# Patient Record
Sex: FEMALE | Race: WHITE | Employment: FULL TIME | URBAN - METROPOLITAN AREA
[De-identification: names, ages, dates, MRNs, and addresses within clinical notes are randomized per-mention and may not be internally consistent; named-entity substitution may affect disease eponyms.]

---

## 2018-02-16 ENCOUNTER — APPOINTMENT (OUTPATIENT)
Dept: GENERAL RADIOLOGY | Facility: CLINIC | Age: 43
End: 2018-02-16
Attending: PHYSICIAN ASSISTANT
Payer: OTHER MISCELLANEOUS

## 2018-02-16 ENCOUNTER — HOSPITAL ENCOUNTER (EMERGENCY)
Facility: CLINIC | Age: 43
Discharge: HOME | End: 2018-02-16
Attending: PHYSICIAN ASSISTANT | Admitting: PHYSICIAN ASSISTANT
Payer: OTHER MISCELLANEOUS

## 2018-02-16 VITALS
OXYGEN SATURATION: 100 % | TEMPERATURE: 97.6 F | HEART RATE: 66 BPM | BODY MASS INDEX: 29.45 KG/M2 | HEIGHT: 61 IN | DIASTOLIC BLOOD PRESSURE: 77 MMHG | SYSTOLIC BLOOD PRESSURE: 129 MMHG | RESPIRATION RATE: 16 BRPM | WEIGHT: 156 LBS

## 2018-02-16 DIAGNOSIS — S60.221A CONTUSION OF RIGHT HAND, INITIAL ENCOUNTER: ICD-10-CM

## 2018-02-16 PROCEDURE — 29125 APPL SHORT ARM SPLINT STATIC: CPT | Mod: RT

## 2018-02-16 PROCEDURE — 99284 EMERGENCY DEPT VISIT MOD MDM: CPT

## 2018-02-16 PROCEDURE — 73130 X-RAY EXAM OF HAND: CPT | Mod: RT

## 2018-02-16 PROCEDURE — 25000132 ZZH RX MED GY IP 250 OP 250 PS 637: Performed by: PHYSICIAN ASSISTANT

## 2018-02-16 PROCEDURE — 73110 X-RAY EXAM OF WRIST: CPT | Mod: RT

## 2018-02-16 RX ORDER — HYDROCODONE BITARTRATE AND ACETAMINOPHEN 5; 325 MG/1; MG/1
1-2 TABLET ORAL EVERY 4 HOURS PRN
Qty: 15 TABLET | Refills: 0 | Status: SHIPPED | OUTPATIENT
Start: 2018-02-16 | End: 2019-03-17

## 2018-02-16 RX ORDER — IBUPROFEN 600 MG/1
600 TABLET, FILM COATED ORAL ONCE
Status: COMPLETED | OUTPATIENT
Start: 2018-02-16 | End: 2018-02-16

## 2018-02-16 RX ADMIN — IBUPROFEN 600 MG: 600 TABLET ORAL at 14:35

## 2018-02-16 ASSESSMENT — ENCOUNTER SYMPTOMS
ARTHRALGIAS: 1
JOINT SWELLING: 1
NUMBNESS: 0

## 2018-02-16 NOTE — ED NOTES
Right hand crushed in a machine while working around noon today.  CMS intact  Patient alert and oriented x3.  Airway, breathing and circulation intact.  Last TYlenol at 1200

## 2018-02-16 NOTE — ED AVS SNAPSHOT
[facility]    [address]    [facility] [address]    Phone:  [phone]    Fax:  [phone]                                       Tran Rondon   MRN: [id number]    Department:  [facility] Emergency Department   Date of Visit:  2/16/2018           After Visit Summary Signature Page     I have received my discharge instructions, and my questions have been answered. I have discussed any challenges I see with this plan with the nurse or doctor.    ..........................................................................................................................................  Patient/Patient Representative Signature      ..........................................................................................................................................  Patient Representative Print Name and Relationship to Patient    ..................................................               ................................................  Date                                            Time    ..........................................................................................................................................  Reviewed by Signature/Title    ...................................................              ..............................................  Date                                                            Time

## 2018-02-16 NOTE — DISCHARGE INSTRUCTIONS
You can take Ibuprofen 600 mg three times daily and/or Tylenol 500-1000, alternating every 3-4 hours, for pain/fevers/body aches. No more than 4000 mg of Tylenol in 24 hours and no more than 3200 mg Ibuprofen in 24 hours.   Contusión De La Mano [Contusion: Hand]  Usted tiene toby CONTUSIÓN de la mano. [place] causa dolor localizado, hinchazón y a veces magulladura. No se ha roto ningún hueso. Esta lesión tardará de unos días a unas semanas en curarse.  Cuidado En Casa:  1) Mantenga el brazo elevado para reducir el dolor y la hinchazón. [place] es muy importante stef las primeras 48 horas.  2) Mantenga el yeso o la tablilla completamente seca a todo tiempo. Cuando se bañe ponga el brazo fuera del agua, protegido por toby bolsa plástica carley atada por la parte de arriba con toby ligadura elástica. Si se le moja el yeso o la tabilla de fibra de shahab, puede secarla con un secador de pelo.  3) Puede usar acetaminofén (Tylenol) o ibuprofeno (Motrin, Advil) para controlar el dolor, a menos que le receten otro medicamento para el dolor. [ NOTA : Si sufre de enfermedad del hígado o riñones, o alguna vez tuvo toby úlcera estomacal o sangrado gastrointestinal, hable con guy médico antes de usar estos medicamentos.] No use ibuprofeno con niños menores de seis meses de edad.  Seguimiento  con guy médico o en esta institución si no está empezando a mejorar en los próximos LAURENT días.  Nota : si le tomaron radiografías, un radiólogo las revisará y se le notificará de cualquier nuevo hallazgo que pueda afectar guy atención.  Busque Prontamente Atención Médica  si algo de lo siguiente ocurre:  -- Aumento del dolor o hinchazón  -- Enrojecimiento, calor o drenaje  -- Las stefano o los dedos se vuelven morados, fríos, adormecidos o con hormigueo  Date Last Reviewed: 4/29/2015 2000-2017 [employer]. [address] [place] [address]. Todos los derechos reservados. Esta información no pretende sustituir la atención  médica profesional. Sólo guy médico puede diagnosticar y tratar un problema de cristina.          Hand Contusion  You have a contusion. This is also called a bruise. There is swelling and some bleeding under the skin, but no broken bones. This injury generally takes a few days to a few weeks to heal.  During that time, the bruise will typically change in color from reddish, to purple-blue, to greenish-yellow, then to yellow-brown.  Home care    Elevate the hand to reduce pain and swelling. As much as possible, sit or lie down with the hand raised about the level of your heart. This is especially important during the first 48 hours.    Ice the hand to help reduce pain and swelling. Wrap a cold source (ice pack or ice cubes in a plastic bag) in a thin towel. Apply to the bruised area for 20 minutes every 1 to 2 hours the first day. Continue this 3 to 4 times a day until the pain and swelling goes away.    Unless another medicine was prescribed, you can take acetaminophen, ibuprofen, or naproxen to control pain. (If you have chronic liver or kidney disease or ever had a stomach ulcer or gastrointestinal bleeding, talk with your doctor before using these medicines.)  Follow up  Follow up with your healthcare provider or our staff as advised. Call if you are not improving within 1 to 2 weeks.  When to seek medical advice   Call your healthcare provider right away if you have any of the following:    Increased pain or swelling    Arm becomes cold, blue, numb or tingly    Signs of infection: Warmth, drainage, or increased redness or pain around the bruise    Inability to move the injured hand     Frequent bruising for unknown reasons  Date Last Reviewed: 2/1/2017 2000-2017 [employer]. [address] [place] [address]. All rights reserved. This information is not intended as a substitute for professional medical care. Always follow your healthcare professional's instructions.

## 2018-02-16 NOTE — LETTER
February 16, 2018      To Whom It May Concern:      Tran Rondon was seen in our Emergency Department today, 02/16/18.  Please excuse her from work due to hand injury on Monday, 2/19-2/21.    Sincerely,          Jessica Kowalski PA-C

## 2018-02-16 NOTE — ED PROVIDER NOTES
"  History     Chief Complaint:  Hand Injury    HPI   Tran Rondon is an otherwise healthy right handed 42 year old female who presents with a hand injury. The patient reports she was making lipsticks at work around noon and the machine she was using crashed down onto her right hand. She states she pulled her hand out as the machine was coming down and that it was not crushed/stuck between the machine and the table. She states she has pain in her right wrist, hand, and fingers and is concerned it might be broken, prompting her to come to the ED for evaluation. She notes she took Tylenol for pain and it has helped somewhat. She denies numbness in her hand or fingers or elbow pain.    Allergies:  No known drug allergies     Medications:    The patient is currently on no regular medications.     Past Medical History:    The patient does not have any past pertinent medical history.     Past Surgical History:    Appendectomy     Family History:    History reviewed. No pertinent family history.      Social History:  Smoking status: Never smoker  Alcohol use: No   Marital Status:  Unknown [6]     Review of Systems   Musculoskeletal: Positive for arthralgias (right wrist, hand, fingers) and joint swelling.   Neurological: Negative for numbness.   All other systems reviewed and are negative.      Physical Exam     Patient Vitals for the past 24 hrs:   BP Temp Temp src Pulse Resp SpO2 Height Weight   02/16/18 1418 129/77 97.6  F (36.4  C) Oral 66 16 100 % 1.549 m (5' 1\") 70.8 kg (156 lb)        Physical Exam  Constitutional:  Alert, attentive  Cardiovascular:  2+ radial and ulnar pulses to the bilateral upper extremities   Neurological:  5/5 strength to the radian, ulnar and median motor distributions;      sensation intact to light touch to the radian, ulnar and median distributions  MSK:   There is swelling with bruising and tenderness over the dorsal aspect of the right hand at the 2nd-4th metacarpals. The 2nd-4th " digits are slightly tender    to the touch as well but not obviously swollen or bruised. There is mild dorsal right wrist pain and limited ROM of wrist and digits. No pain to the right    forearm, elbow.   Skin:    Skin is warm and dry.      Emergency Department Course   Imaging:  Radiographic findings were communicated with the patient who voiced understanding of the findings.    Hand XR, G/E 3 Views, Right:  Negative.  As read by Radiology.     Wrist XR, G/E 3 Views, Right:  Negative.  As read by Radiology.     Interventions:  1435: Ibuprofen 600 mg PO     Emergency Department Course:  Past medical records, nursing notes, and vitals reviewed.  1426: I performed an exam of the patient and obtained history, as documented above.   The patient was sent for a right hand x-ray and right wrist x-ray while in the emergency department, findings above.     1500: I rechecked the patient. Explained findings to the patient.    Findings and plan explained to the Patient. Patient discharged home with instructions regarding supportive care, medications, and reasons to return. The importance of close follow-up was reviewed.   Impression & Plan    Medical Decision Making:  Tran Rondon is a 42 year old female presents for evaluation after injuring her right hand at work.  Signs and symptoms are consistent with a contusion.  A broad differential was considered including sprain, strain, fracture, tendon rupture, nerve impingement/compromise, referred pain. X-rays of the hand and wrist are negative. Supportive outpatient management is indicated. Velcro wrist splint provided for comfort. Pain medications to use as needed and precautions were given regarding narcotics.  Rest, ice, and elevation treatment was discussed with the patient. No other injuries. Close follow-up with patient's primary care physician per discharge precautions. Contusion discharge instructions given for home.     Diagnosis:    ICD-10-CM   1. Contusion of  right hand, initial encounter S60.221A     Disposition:  discharged to home    Discharge Medications:  New Prescriptions    HYDROCODONE-ACETAMINOPHEN (NORCO) 5-325 MG PER TABLET    Take 1-2 tablets by mouth every 4 hours as needed for moderate to severe pain       Codi Chowdary  2/16/2018   [facility] EMERGENCY DEPARTMENT    I, Codi Chowdary, am serving as a scribe at 2:26 PM on 2/16/2018 to document services personally performed by Kell Kowalski PA-C based on my observations and the provider's statements to me.        Kell Kowalski PA-C  02/16/18 1546

## 2018-02-16 NOTE — ED AVS SNAPSHOT
[facility] Emergency Department    [address]    [facility] [address]    Phone:  [phone]    Fax:  [phone]                                       Tran Rondon   MRN: [id number]    Department:  [facility] Emergency Department   Date of Visit:  2/16/2018           Patient Information     Date Of Birth          1975        Your diagnoses for this visit were:     Contusion of right hand, initial encounter        You were seen by Kell Kowalski PA-C.      Follow-up Information     Follow up with [facility].    Specialties:  Sports Medicine, Pain Management, Obstetrics & Gynecology, Pediatrics, Internal Medicine, Nephrology    Why:  As needed    Contact information:    [address] [place] [address]  [facility] [address] [phone]        Follow up with [facility] Emergency Department.    Specialty:  EMERGENCY MEDICINE    Why:  If symptoms worsen    Contact information:    [address] [facility] [id number] [phone]        Discharge Instructions         You can take Ibuprofen 600 mg three times daily and/or Tylenol 500-1000, alternating every 3-4 hours, for pain/fevers/body aches. No more than 4000 mg of Tylenol in 24 hours and no more than 3200 mg Ibuprofen in 24 hours.   Contusión De La Mano [Contusion: Hand]  Usted tiene toby CONTUSIÓN de la mano. [place] causa dolor localizado, hinchazón y a veces magulladura. No se ha roto ningún hueso. Esta lesión tardará de unos días a unas semanas en curarse.  Cuidado En Casa:  1) Mantenga el brazo elevado para reducir el dolor y la hinchazón. [place] es muy importante stef las primeras 48 horas.  2) Mantenga el yeso o la tablilla completamente seca a todo tiempo. Cuando se bañe ponga el brazo fuera del agua, protegido por toby bolsa plástica carley atada por la parte de arriba con toby ligadura elástica. Si se le moja el yeso o la tabilla  de fibra de shahab, puede secarla con un secador de pelo.  3) Puede usar acetaminofén (Tylenol) o ibuprofeno (Motrin, Advil) para controlar el dolor, a menos que le receten otro medicamento para el dolor. [ NOTA : Si sufre de enfermedad del hígado o riñones, o alguna vez tuvo toby úlcera estomacal o sangrado gastrointestinal, hable con guy médico antes de usar estos medicamentos.] No use ibuprofeno con niños menores de seis meses de edad.  Seguimiento  con guy médico o en esta institución si no está empezando a mejorar en los próximos LAURENT días.  Nota : si le tomaron radiografías, un radiólogo las revisará y se le notificará de cualquier nuevo hallazgo que pueda afectar guy atención.  Busque Prontamente Atención Médica  si algo de lo siguiente ocurre:  -- Aumento del dolor o hinchazón  -- Enrojecimiento, calor o drenaje  -- Las stefano o los dedos se vuelven morados, fríos, adormecidos o con hormigueo  Date Last Reviewed: 4/29/2015 2000-2017 [employer]. [address] [place] [address]. Todos los derechos reservados. Esta información no pretende sustituir la atención médica profesional. Sólo guy médico puede diagnosticar y tratar un problema de cristina.          Hand Contusion  You have a contusion. This is also called a bruise. There is swelling and some bleeding under the skin, but no broken bones. This injury generally takes a few days to a few weeks to heal.  During that time, the bruise will typically change in color from reddish, to purple-blue, to greenish-yellow, then to yellow-brown.  Home care    Elevate the hand to reduce pain and swelling. As much as possible, sit or lie down with the hand raised about the level of your heart. This is especially important during the first 48 hours.    Ice the hand to help reduce pain and swelling. Wrap a cold source (ice pack or ice cubes in a plastic bag) in a thin towel. Apply to the bruised area for 20 minutes every 1 to 2 hours the first day.  Continue this 3 to 4 times a day until the pain and swelling goes away.    Unless another medicine was prescribed, you can take acetaminophen, ibuprofen, or naproxen to control pain. (If you have chronic liver or kidney disease or ever had a stomach ulcer or gastrointestinal bleeding, talk with your doctor before using these medicines.)  Follow up  Follow up with your healthcare provider or our staff as advised. Call if you are not improving within 1 to 2 weeks.  When to seek medical advice   Call your healthcare provider right away if you have any of the following:    Increased pain or swelling    Arm becomes cold, blue, numb or tingly    Signs of infection: Warmth, drainage, or increased redness or pain around the bruise    Inability to move the injured hand     Frequent bruising for unknown reasons  Date Last Reviewed: 2/1/2017 2000-2017 [employer]. [address] [place] [address]. All rights reserved. This information is not intended as a substitute for professional medical care. Always follow your healthcare professional's instructions.          24 Hour Appointment Hotline       To make an appointment at any [facility], call [phone] ([phone]). If you don't have a family doctor or clinic, we will help you find one. [facility] are conveniently located to serve the needs of you and your family.             Review of your medicines      Notice     You have not been prescribed any medications.            Procedures and tests performed during your visit     Hand XR, G/E 3 views, right    Wrist XR, G/E 3 views, right      Orders Needing Specimen Collection     None      Pending Results     Date and Time Order Name Status Description    2/16/2018 1428 Wrist XR, G/E 3 views, right Preliminary     2/16/2018 1424 Hand XR, G/E 3 views, right Preliminary             Pending Culture Results     No orders found from 2/14/2018 to 2/17/2018.            Pending Results  Instructions     If you had any lab results that were not finalized at the time of your Discharge, you can call the ED Lab Result RN at [phone]. You will be contacted by this team for any positive Lab results or changes in treatment. The nurses are available 7 days a week from 10A to 6:30P.  You can leave a message 24 hours per day and they will return your call.        Test Results From Your Hospital Stay        2/16/2018  2:58 PM      Narrative     RIGHT HAND THREE OR MORE VIEWS  2/16/2018 2:43 PM    HISTORY:  Crush injury at work.     COMPARISON:  None.        Impression     IMPRESSION:  Negative.          2/16/2018  2:57 PM      Narrative     RIGHT WRIST THREE OR MORE VIEWS  2/16/2018 2:43 PM    HISTORY:  Pain, trauma.     COMPARISON:  None.        Impression     IMPRESSION:  Negative.                 Clinical Quality Measure: Blood Pressure Screening     Your blood pressure was checked while you were in the emergency department today. The last reading we obtained was  BP: 129/77 . Please read the guidelines below about what these numbers mean and what you should do about them.  If your systolic blood pressure (the top number) is less than 120 and your diastolic blood pressure (the bottom number) is less than 80, then your blood pressure is normal. There is nothing more that you need to do about it.  If your systolic blood pressure (the top number) is 120-139 or your diastolic blood pressure (the bottom number) is 80-89, your blood pressure may be higher than it should be. You should have your blood pressure rechecked within a year by a primary care provider.  If your systolic blood pressure (the top number) is 140 or greater or your diastolic blood pressure (the bottom number) is 90 or greater, you may have high blood pressure. High blood pressure is treatable, but if left untreated over time it can put you at risk for heart attack, stroke, or kidney failure. You should have your blood pressure rechecked  "by a primary care provider within the next 4 weeks.  If your provider in the emergency department today gave you specific instructions to follow-up with your doctor or provider even sooner than that, you should follow that instruction and not wait for up to 4 weeks for your follow-up visit.        Thank you for choosing [facility]       Thank you for choosing [facility] for your care. Our goal is always to provide you with excellent care. Hearing back from our patients is one way we can continue to improve our services. Please take a few minutes to complete the written survey that you may receive in the mail after you visit with us. Thank you!        [employer] Information     [employer] lets you send messages to your doctor, view your test results, renew your prescriptions, schedule appointments and more. To sign up, go to [email or web address] . Click on \"Log in\" on the left side of the screen, which will take you to the Welcome page. Then click on \"Sign up Now\" on the right side of the page.     You will be asked to enter the access code listed below, as well as some personal information. Please follow the directions to create your username and password.     Your access code is: [email or web address]  Expires: 2018  2:59 PM     Your access code will  in 90 days. If you need help or a new code, please call your [facility] clinic or [phone].        Care EveryWhere ID     This is your Care EveryWhere ID. This could be used by other organizations to access your [facility] medical records  [id number]        Equal Access to Services     MARYBETH GARCIA : Hadii calvin Heaton, waaxda lujoshua, qaybta kaalmaraoul siddiqui . [facility] [phone].    ATENCIÓN: Si habla español, tiene a guy disposición servicios gratuitos de asistencia lingüística. Llame al [phone].    We comply with applicable federal civil rights laws and [place] laws. We do not discriminate on the basis of " race, color, national origin, age, disability, sex, sexual orientation, or gender identity.            After Visit Summary       This is your record. Keep this with you and show to your community pharmacist(s) and doctor(s) at your next visit.

## 2019-03-16 PROCEDURE — 99283 EMERGENCY DEPT VISIT LOW MDM: CPT

## 2019-03-17 ENCOUNTER — APPOINTMENT (OUTPATIENT)
Dept: GENERAL RADIOLOGY | Facility: CLINIC | Age: 44
End: 2019-03-17
Attending: EMERGENCY MEDICINE

## 2019-03-17 ENCOUNTER — HOSPITAL ENCOUNTER (EMERGENCY)
Facility: CLINIC | Age: 44
Discharge: HOME | End: 2019-03-17
Attending: EMERGENCY MEDICINE | Admitting: EMERGENCY MEDICINE

## 2019-03-17 VITALS
WEIGHT: 156 LBS | DIASTOLIC BLOOD PRESSURE: 79 MMHG | BODY MASS INDEX: 29.48 KG/M2 | RESPIRATION RATE: 16 BRPM | TEMPERATURE: 98.2 F | OXYGEN SATURATION: 98 % | SYSTOLIC BLOOD PRESSURE: 132 MMHG | HEART RATE: 84 BPM

## 2019-03-17 DIAGNOSIS — M62.838 TRAPEZIUS MUSCLE SPASM: ICD-10-CM

## 2019-03-17 DIAGNOSIS — S46.911A STRAIN OF RIGHT SHOULDER, INITIAL ENCOUNTER: ICD-10-CM
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
## 2019-03-17 PROCEDURE — 25000132 ZZH RX MED GY IP 250 OP 250 PS 637: Performed by: EMERGENCY MEDICINE

## 2019-03-17 PROCEDURE — 73030 X-RAY EXAM OF SHOULDER: CPT | Mod: RT

## 2019-03-17 RX ORDER — IBUPROFEN 600 MG/1
600 TABLET, FILM COATED ORAL ONCE
Status: COMPLETED | OUTPATIENT
Start: 2019-03-17 | End: 2019-03-17

## 2019-03-17 RX ORDER — CYCLOBENZAPRINE HCL 10 MG
5-10 TABLET ORAL EVERY 8 HOURS PRN
Qty: 12 TABLET | Refills: 0 | Status: SHIPPED | OUTPATIENT
Start: 2019-03-17 | End: 2019-03-23

## 2019-03-17 RX ORDER — ACETAMINOPHEN 325 MG/1
975 TABLET ORAL ONCE
Status: COMPLETED | OUTPATIENT
Start: 2019-03-17 | End: 2019-03-17

## 2019-03-17 RX ADMIN — IBUPROFEN 600 MG: 600 TABLET ORAL at 00:02

## 2019-03-17 RX ADMIN — ACETAMINOPHEN 975 MG: 325 TABLET, FILM COATED ORAL at 00:02

## 2019-03-17 ASSESSMENT — ENCOUNTER SYMPTOMS
ARTHRALGIAS: 1
SHORTNESS OF BREATH: 0
NECK PAIN: 1

## 2019-03-17 NOTE — LETTER
March 17, 2019      To Whom It May Concern:      Tran Rondon was seen in our Emergency Department today, 03/17/19.  I expect her condition to improve over the next 1-2 days.  She may return to work/school when improved.    Sincerely,        Amos Queen RN
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
## 2019-03-17 NOTE — ED AVS SNAPSHOT
[facility] Emergency Department  [address]  [facility] [address]  Phone:  [phone]  Fax:  [phone]                                    Tran Rondon   MRN: [id number]    Department:  [facility] Emergency Department   Date of Visit:  3/16/2019           After Visit Summary Signature Page    I have received my discharge instructions, and my questions have been answered. I have discussed any challenges I see with this plan with the nurse or doctor.    ..........................................................................................................................................  Patient/Patient Representative Signature      ..........................................................................................................................................  Patient Representative Print Name and Relationship to Patient    ..................................................               ................................................  Date                                   Time    ..........................................................................................................................................  Reviewed by Signature/Title    ...................................................              ..............................................  Date                                               Time          22EPIC Rev 08/18
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
## (undated) RX ORDER — ACETAMINOPHEN 325 MG/1
TABLET ORAL
Status: DISPENSED
Start: 2019-03-17